# Patient Record
Sex: MALE | Race: WHITE | Employment: OTHER | URBAN - METROPOLITAN AREA
[De-identification: names, ages, dates, MRNs, and addresses within clinical notes are randomized per-mention and may not be internally consistent; named-entity substitution may affect disease eponyms.]

---

## 2018-03-12 ENCOUNTER — EKG ENCOUNTER (OUTPATIENT)
Dept: LAB | Age: 73
End: 2018-03-12
Attending: INTERNAL MEDICINE
Payer: MEDICARE

## 2018-03-12 ENCOUNTER — HOSPITAL ENCOUNTER (OUTPATIENT)
Dept: GENERAL RADIOLOGY | Age: 73
Discharge: HOME | End: 2018-03-12
Attending: INTERNAL MEDICINE
Payer: MEDICARE

## 2018-03-12 DIAGNOSIS — I10 HYPERTENSION, UNSPECIFIED TYPE: ICD-10-CM

## 2018-03-12 DIAGNOSIS — I10 HTN (HYPERTENSION): Primary | ICD-10-CM

## 2018-03-12 DIAGNOSIS — E66.9 OBESITY: ICD-10-CM

## 2018-03-12 LAB
ATRIAL RATE: 77 BPM
P-R INTERVAL: 148 MS
Q-T INTERVAL: 364 MS
QRS DURATION: 74 MS
QTC CALCULATION (BEZET): 411 MS
R AXIS: 18 DEGREES
T AXIS: 20 DEGREES
VENTRICULAR RATE: 77 BPM

## 2018-03-12 PROCEDURE — 71046 X-RAY EXAM CHEST 2 VIEWS: CPT | Performed by: INTERNAL MEDICINE

## 2018-03-12 PROCEDURE — 93010 ELECTROCARDIOGRAM REPORT: CPT | Performed by: INTERNAL MEDICINE

## 2018-03-12 PROCEDURE — 93005 ELECTROCARDIOGRAM TRACING: CPT

## 2020-10-17 ENCOUNTER — HOSPITAL ENCOUNTER (OUTPATIENT)
Dept: ULTRASOUND IMAGING | Age: 75
Discharge: HOME | End: 2020-10-17
Attending: INTERNAL MEDICINE
Payer: MEDICARE

## 2020-10-17 DIAGNOSIS — R31.29 MICROSCOPIC HEMATURIA: ICD-10-CM

## 2020-10-17 DIAGNOSIS — R82.90 ABNORMAL URINE: ICD-10-CM

## 2020-10-17 PROCEDURE — 76770 US EXAM ABDO BACK WALL COMP: CPT | Performed by: INTERNAL MEDICINE

## 2020-12-18 ENCOUNTER — HOSPITAL ENCOUNTER (OUTPATIENT)
Dept: CT IMAGING | Facility: HOSPITAL | Age: 75
Discharge: HOME | End: 2020-12-18
Attending: INTERNAL MEDICINE
Payer: MEDICARE

## 2020-12-18 DIAGNOSIS — M54.9 BACK PAIN: ICD-10-CM

## 2020-12-18 DIAGNOSIS — R31.9 BLOOD IN URINE: ICD-10-CM

## 2020-12-18 PROCEDURE — 82565 ASSAY OF CREATININE: CPT

## 2020-12-18 PROCEDURE — 76377 3D RENDER W/INTRP POSTPROCES: CPT | Performed by: INTERNAL MEDICINE

## 2020-12-18 PROCEDURE — 74178 CT ABD&PLV WO CNTR FLWD CNTR: CPT | Performed by: INTERNAL MEDICINE

## 2021-11-10 ENCOUNTER — HOSPITAL ENCOUNTER (OUTPATIENT)
Dept: ULTRASOUND IMAGING | Age: 76
Discharge: HOME | End: 2021-11-10
Attending: INTERNAL MEDICINE
Payer: MEDICARE

## 2021-11-10 DIAGNOSIS — R82.90 ABNORMAL RESULT ON SCREENING URINE TEST: ICD-10-CM

## 2021-11-10 DIAGNOSIS — R31.9 HEMATURIA: ICD-10-CM

## 2021-11-10 PROCEDURE — 76770 US EXAM ABDO BACK WALL COMP: CPT | Performed by: INTERNAL MEDICINE

## 2024-09-09 ENCOUNTER — ORDER TRANSCRIPTION (OUTPATIENT)
Dept: ADMINISTRATIVE | Facility: HOSPITAL | Age: 79
End: 2024-09-09

## 2024-09-09 DIAGNOSIS — I42.9 CARDIOMYOPATHY (HCC): Primary | ICD-10-CM

## 2025-02-06 ENCOUNTER — HOSPITAL ENCOUNTER (EMERGENCY)
Facility: HOSPITAL | Age: 80
Discharge: LEFT BEFORE DISCHARGE | End: 2025-02-06
Payer: COMMERCIAL

## 2025-02-06 NOTE — ED QUICK NOTES
Pt ambulatory to triage desk reports he does not wish to stay and will follow up with his physician.    Gait steady, speech clear.

## 2025-08-12 ENCOUNTER — HOSPITAL ENCOUNTER (OUTPATIENT)
Dept: CV DIAGNOSTICS | Facility: HOSPITAL | Age: 80
Discharge: HOME | End: 2025-08-12
Attending: INTERNAL MEDICINE

## 2025-08-12 ENCOUNTER — HOSPITAL ENCOUNTER (OUTPATIENT)
Dept: BONE DENSITY | Age: 80
Discharge: HOME | End: 2025-08-12
Attending: INTERNAL MEDICINE

## 2025-08-12 DIAGNOSIS — Z01.818 PRE-OPERATIVE CLEARANCE: ICD-10-CM

## 2025-08-12 DIAGNOSIS — M81.0 OSTEOPOROSIS: ICD-10-CM

## 2025-08-12 PROCEDURE — 93306 TTE W/DOPPLER COMPLETE: CPT | Performed by: INTERNAL MEDICINE

## 2025-08-12 PROCEDURE — 77080 DXA BONE DENSITY AXIAL: CPT | Performed by: INTERNAL MEDICINE
